# Patient Record
Sex: MALE | URBAN - METROPOLITAN AREA
[De-identification: names, ages, dates, MRNs, and addresses within clinical notes are randomized per-mention and may not be internally consistent; named-entity substitution may affect disease eponyms.]

---

## 2018-10-08 ENCOUNTER — OFFICE VISIT (OUTPATIENT)
Dept: FAMILY MEDICINE CLINIC | Age: 58
End: 2018-10-08

## 2018-10-08 VITALS
DIASTOLIC BLOOD PRESSURE: 80 MMHG | SYSTOLIC BLOOD PRESSURE: 151 MMHG | TEMPERATURE: 98.7 F | BODY MASS INDEX: 28.51 KG/M2 | WEIGHT: 160.9 LBS | HEIGHT: 63 IN | OXYGEN SATURATION: 96 % | RESPIRATION RATE: 19 BRPM | HEART RATE: 54 BPM

## 2018-10-08 DIAGNOSIS — F41.9 ANXIETY: ICD-10-CM

## 2018-10-08 DIAGNOSIS — I10 ESSENTIAL HYPERTENSION: Primary | ICD-10-CM

## 2018-10-08 DIAGNOSIS — R10.13 EPIGASTRIC PAIN: ICD-10-CM

## 2018-10-08 RX ORDER — AMLODIPINE BESYLATE 5 MG/1
5 TABLET ORAL DAILY
COMMUNITY
End: 2018-10-08 | Stop reason: SDUPTHER

## 2018-10-08 RX ORDER — FLUOXETINE HYDROCHLORIDE 20 MG/1
20 CAPSULE ORAL DAILY
Qty: 30 CAP | Refills: 1 | Status: SHIPPED | OUTPATIENT
Start: 2018-10-08

## 2018-10-08 RX ORDER — AMLODIPINE BESYLATE 10 MG/1
10 TABLET ORAL DAILY
Qty: 30 TAB | Refills: 1 | Status: SHIPPED | OUTPATIENT
Start: 2018-10-08

## 2018-10-08 RX ORDER — LISINOPRIL AND HYDROCHLOROTHIAZIDE 12.5; 2 MG/1; MG/1
TABLET ORAL DAILY
COMMUNITY

## 2018-10-08 NOTE — PROGRESS NOTES
Chief Complaint   Patient presents with    Establish Care    Hypertension    Anxiety     Pt here to establish care with provide     Pt states he was being seen by another clinic [facility] [place] being treated for HTN with lisinopril-HCTZ and amlodipine, pt states the current medication dies not help him, which is why hes wanting another opinion. Pt also c/o frequent anxiety stating he consistently rethinks over and over things, and its hard for him to sleep because of the anxiety then he starts to get stressed out.

## 2018-10-08 NOTE — MR AVS SNAPSHOT
[address] [address] 
[phone] Patient: Ralf Christensen MRN: [id number] Visit Information Arch Robin Chicas Personal Médico Departamento Teléfono del Dep. Número de visita 10/8/2018  9:15 AM Weston Layne  [facility] [phone] [phone] Follow-up Instructions Return in about 4 weeks (around 11/5/2018). Upcoming Health Maintenance Date Due Hepatitis C Screening 1960 DTaP/Tdap/Td series (1 - Tdap) 8/6/1980 Shingrix Vaccine Age 50> (1 of 2) 8/6/2009 FOBT Q 1 YEAR AGE 50-75 8/6/2009 Influenza Age 5 to Adult 8/1/2018 Alergias  Review Complete El: 10/8/2018 Por: Weston Layne NP  
 A partir del:  10/8/2018 No Known Allergies Vacunas actuales Abhilash [place] No hay ninguna vacuna archivada. No revisadas esta visita You Were Diagnosed With   
  
 Lucy Lna Essential hypertension    -  Primary ICD-10-CM: I10 
ICD-9-CM: 401.9 Epigastric pain     ICD-10-CM: R10.13 ICD-9-CM: 789.06 Anxiety     ICD-10-CM: F41.9 ICD-9-CM: 300.00 Partes vitales PS Pulso Temperatura Resp Buda ( percentil de crecimiento) Peso (percentil de crecimiento) 151/80 (!) 54 98.7 °F (37.1 °C) (Oral) 19 5' 3\" (1.6 m) 160 lb 14.4 oz (73 kg) SpO2 BMI (IMC) Estatus de tabaquísmo 96% 28.5 kg/m2 Former Smoker Historial de signos vitales BMI and BSA Data Body Mass Index Body Surface Area 28.5 kg/m 2 1.8 m 2 Adenike Cardona Pharmacy Name Phone [facility] [address] [facility] [place] [facility], [address] [address] [place] [phone] Lepe lista de medicamentos actualizada Lista actualizada 10/8/18 10:08 AM.  Francisco Ogon use lepe lista de medicamentos más reciente. amLODIPine 10 mg tablet También conocido duc:  Rainer Urbinaon Take 1 Tab by mouth daily. FLUoxetine 20 mg capsule También conocido duc:  PROzac Take 1 Cap by mouth daily. lisinopril-hydroCHLOROthiazide 20-12.5 mg per tablet También conocido duc:  Cloycnay Luna Take  by mouth daily. Recetas Enviado a la [place] Refills FLUoxetine (PROZAC) 20 mg capsule 1 Sig: Take 1 Cap by mouth daily. Class: Normal  
 Pharmacy: [address] [place] [facility], [address] #: [phone] Route: Oral  
 amLODIPine (NORVASC) 10 mg tablet 1 Sig: Take 1 Tab by mouth daily. Class: Normal  
 [facility] [address] [facility] [place] [facility], [address] Ph #: [phone] Route: Oral  
  
Hicimos lo siguiente LIPID PANEL [44721 CPT(R)] METABOLIC PANEL, COMPREHENSIVE [04838 CPT(R)] TSH 3RD GENERATION [41295 CPT(R)] Instrucciones de seguimiento Return in about 4 weeks (around 11/5/2018). Por hacer 10/08/2018 Imaging:  US ABD COMP Instrucciones para el Paciente Presión arterial betsy: Instrucciones de cuidado - [ High Blood Pressure: Care Instructions ] Instrucciones de cuidado Si chandler presión arterial suele ser superior a 130/80, usted tiene presión arterial betsy o hipertensión. [place] significa que el número de [place] es 130 o más alto o que el número de abajo es [de-identified] o [address], o ambas cosas. A pesar de lo que mucha gente yissel, la hipertensión no suele causar dolor de jayne ni provocar mareos o aturdimiento. Generalmente, no presenta síntomas. Sin embargo, aumenta el riesgo de ataque al corazón, ataque cerebral, y daños en los riñones o en los ojos. A mayor presión arterial, mayor riesgo. Chandler médico le dará dashawn meta para chandler presión arterial. Chandler meta se basará en chandler evan y chandler edad.  
Los cambios de estilo de abril, duc alimentarse en forma saludable y [place], siempre son importantes para ayudarle a bajar la presión arterial. También podría caio medicamentos para lograr chandler meta para la presión arterial. 
La atención de seguimiento es dashawn parte clave de chandler tratamiento y seguridad. Asegúrese de hacer y acudir a todas las citas, y llame a chandler médico si está teniendo problemas. También es dashawn buena idea saber los resultados de leander exámenes y mantener dashawn lista de los medicamentos que kota. Cómo puede cuidarse en el hogar? Tratamiento médico 
· Si glenna de caio leander medicamentos, chandler presión arterial volverá a subir. Es posible que deba caio un tipo de [place], o más de [place], para reducir la presión arterial. Sea juan jose con los medicamentos. [place] chandler medicamento exactamente duc se lo hayan indicado. Llame a chandler médico si yissel estar teniendo problemas con chandler medicamento. · Hable con chandler médico antes de empezar a caio Mala Ferrari. La aspirina puede ayudar a determinadas personas a reducir chandler riesgo de tener un ataque cardíaco o un ataque cerebral. Tina caio aspirina no es adecuado para todo el TRENGEREID, debido a que puede causar sangrado grave. · Visite a chandler médico periódicamente. Usted podría necesitar consultar a chandler médico con más frecuencia al principio o hasta que se reduzca chandler presión arterial. 
· Si usted está tomando medicamentos para la presión arterial, hable con chandler médico antes de caio medicamentos descongestionantes o antiinflamatorios, duc ibuprofeno. Algunos de estos medicamentos pueden elevar la presión arterial. 
· Aprenda a revisarse la presión arterial en chandler hogar. Cambios en el estilo de abril · Mantenga un peso saludable. [place] es particularmente importante si aumenta de peso en la taryn de la cintura. Bajar solo 10 libras (4.5 kg) puede ayudarle a reducir chandler presión arterial. 
· Ramon más ejercicios si chandler médico se lo recomienda. Caminar es dashawn buena opción. Poco a poco, aumente la distancia que [employer]. Intente hacer por lo menos 30 minutos de ejercicio la mayoría de los días de la [place].  También podría nadar, montar en bicicleta o hacer otras actividades. · No tome alcohol o limite la cantidad que fabiola. Hable con chandler médico acerca de si puede caio alcohol. · Trate de limitar la cantidad de sodio que consume a menos de 2,300 miligramos (mg) al día. Chandler médico podría pedirle que trate de consumir menos de 1,500 mg al día. · Coma abundantes frutas (duc bananas [plátanos] y naranjas), verduras, legumbres, granos integrales y productos lácteos de bajo contenido de [place]. · Reduzca la cantidad de grasas saturadas en chandler dieta. Los productos derivados de los [place], duc la [place], el queso y la carne, contienen grasas saturadas. El limitar estos alimentos podría ayudarle a bajar de peso y [place] reducir chandler riesgo de dashawn enfermedad cardíaca. · No fume. El hábito de fumar aumenta chandler riesgo de ataque cerebral y ataque al corazón. Si necesita ayuda para dejar de fumar, hable con chandler médico sobre programas y medicamentos para dejar de fumar. Estos pueden aumentar leander probabilidades de dejar el hábito para siempre. Cuándo debe pedir ayuda? Llame al 911 en cualquier momento que considere que necesita atención de [place]. [place] puede significar que tiene síntomas que sugieren que chandler presión arterial le está causando un problema cardíaco o vascular grave. Chandler presión arterial podría ser superior a 180/110. 
 Por ejemplo, llame al 911 si: 
  · Tiene síntomas de un ataque al corazón. Estos pueden incluir: ¨ Dolor o presión en el pecho, o dashawn sensación extraña en el pecho. ¨ Sudoración. ¨ Falta de aire. ¨ Náuseas o vómito. ¨ Dolor, presión o dashawn sensación extraña en la espalda, el nicolette, la mandíbula, la parte superior del abdomen o en shikha o ambos hombros o brazos. ¨ Aturdimiento o debilidad repentina. ¨ Latidos del corazón rápidos o irregulares.  
  · Tiene síntomas de un ataque cerebral. Estos pueden incluir: 
¨ Entumecimiento, hormigueo, debilidad o parálisis repentinos en la lyly, el brazo o la pierna, sobre todo en un solo lado del cuerpo. ¨ Cambios repentinos en la visión. ¨ Dificultades repentinas para hablar. ¨ Confusión repentina o dificultad súbita para comprender frases sencillas. ¨ Problemas repentinos para caminar o mantener el equilibrio. ¨ Dolor de [place] intenso y repentino, distinto de los christina de jayne anteriores.  
  · Tiene un dolor intenso en la espalda o el abdomen.  
 No espere a que la presión arterial baje por sí trey. Obtenga ayuda de inmediato. 
 Llame a chandler médico ahora mismo o busque atención de inmediato si: 
  · Tiene la presión arterial mucho más betsy de lo normal (duc 180/110 o más betsy), yolanda no tiene síntomas.  
  · Piensa que la presión arterial betsy le está provocando síntomas, duc: ¨ Dolor de jayne intenso. ¨ Visión borrosa.  
 Vigile de cerca los cambios en chandler evan, y asegúrese de comunicarse con chandler médico si: 
  · Chandler presión arterial mide 140/90 o más en al menos 2 ocasiones. [place] significa que el número de [place] es 140 o superior o el número de [facility] es 80 o superior, o ambas cosas.  
  · Marlyn que podría estar teniendo efectos secundarios de los medicamentos para la presión arterial.  
  · Chandler presión arterial suele ser normal, yolanda se eleva por encima de lo normal en al menos 2 ocasiones. Dónde puede encontrar más información en inglés? Lulu linares [email or web address] [place] T505 en la búsqueda para aprender más acerca de \"Presión arterial betsy: Instrucciones de cuidado - [ High Blood Pressure: Care Instructions ]. \" 
Revisado: 6 lala, 2017 Versión del contenido: 11.8 © 3597-9367 Healthwise, Incorporated. Las instrucciones de cuidado fueron adaptadas bajo licencia por Good [employer] (which disclaims liability or warranty for this information). Si usted tiene [place] afección médica o sobre estas instrucciones, siempre pregunte a chandler profesional de evan. [facility], Incorporated niega toda garantía o responsabilidad por chandler uso de esta información. Aprenda acerca de la atención plena para el estrés - [ Learning About Mindfulness for Stress ] 

Saul Schwartzdusty son Alysia Fabius y el estrés? El estrés es lo que usted siente cuando tiene que manejar más cosas de las que está acostumbrado. Muchas cosas pueden causar estrés. Es posible que sienta estrés cuando va a dashawn entrevista de [facility] o corre [place] ortega. Evelyn tipo de estrés a corto plazo es normal e incluso es [place]. Puede ayudarle si tiene que trabajar mucho o reaccionar rápidamente. El estrés también puede durar [employer]. El estrés a elise plazo está causado por situaciones o acontecimientos estresantes. Los ejemplos del estrés a elise plazo incluyen problemas de evan crónicos, problemas continuos en el trabajo y conflictos de [facility]. El estrés a elise plazo puede ser dañino para la evan. La atención, o conciencia, plena se centra en prestar atención únicamente a lo que está ocurriendo en el [facility]. Es un proceso de prestar atención deliberadamente y ser consciente de chandler entorno, leander emociones, leander pensamientos y cómo se siente chandler cuerpo. Usted es consciente de estas cosas, yolanda no juzga estas experiencias duc \"buenas\" o \"malas\". La atención plena puede ayudar a calmar el cuerpo y la mente para ayudarle a sobrellevar la enfermedad, el dolor y el estrés. Cómo ayuda la atención plena a aliviar el estrés? La atención plena puede ayudar a calmar la mente y relajar el cuerpo. Los estudios [employer] puede ayudar a algunas personas a dormir mejor, a sentirse menos ansiosas y a bajar la presión arterial. Y se ha demostrado que ayuda a algunas personas a sobrellevar y vivir mejor con ciertos problemas de evan duc enfermedad cardíaca, depresión, dolor crónico y cáncer. Cómo se practica la atención plena? Tener conciencia plena es prestar atención, estar presente y ser [place].  
· Cuando usted tiene [employer], solo hace dashawn cosa a la vez y presta especial atención a darlene actividad. Por ejemplo, puede sentarse tranquilamente y notar leander emociones o el sabor y [address] de la comida. · Cuando está presente, usted centra chandler atención en las cosas que están pasando en rudy instante. Usted glenna de pensar en el pasado y en el futuro. Cuando usted se centra en el pasado o en el futuro, se pierde momentos que pueden permitirle sanar y fortalecerse. Usted puede perderse momentos duc escuchar la montez de un sophia o andrade un brenda amigable cuando yissel que está solo. · Cuando usted acepta la realidad, no juzga el [address] En chandler lugar, usted acepta leander pensamientos y sentimientos según se presentan. Usted puede practicar en cualquier momento, en cualquier lugar y de cualquier manera que usted [place]do Cellar. Puede practicar de [employer]. Aquí tiene algunas ideas: · Mientras hace las tareas domésticas, duc juan los platos, deje que chandler mente se concentre en lo que tiene en la [place]. Cómo se siente el plato? Está fría o caliente el agua? · Jose De Jesus Flaquita y respire profundamente unas cuantas veces. Cómo es el aire? Es Roula Roulette? · Cuando pueda, tómese tiempo al comienzo del día para sentarse solo y pensar. · Salga a caminar lentamente por sí solo. Cuente los pasos que kota mientras inhala y [place]. · Pruebe a hacer ejercicios de respiración de yoga así duc estiramientos y posturas para fortalecer y relajar los músculos. · Si puede hacer esto en el trabajo, trate de detenerse por un momento cada hora. [address] sensaciones en chandler cuerpo. Dese tiempo para recuperarse y permita que chandler mente se calme antes de volver a lo que estaba haciendo. · Si corey con la ansiedad o con \"pensamientos de preocupación\", imagine chandler mente duc un deondre timur y leander pensamientos de preocupación duc nubes. Ahora, imagine esos pensamientos de preocupación flotando a través del deondre de chandler mente. Simplemente, déjelos pasar mientras los observa. La atención de seguimiento es dashawn parte clave de chandler tratamiento y seguridad. Asegúrese de hacer y acudir a todas las citas, y llame a chandler médico si está teniendo problemas. También es dashawn buena idea saber los resultados de leander exámenes y mantener dashawn lista de los medicamentos que kota. Dónde puede encontrar más información en inglés? Noreen Baer a [email or web address] Donald Gallardo V365 en la búsqueda para aprender más acerca de \"Aprenda acerca de la atención plena para el estrés - [ Learning About Mindfulness for Stress ]. \" 
Revisado: 29 junio, 2018 Versión del contenido: 11.8 © 0687-1502 Healthwise, Incorporated. Las instrucciones de cuidado fueron adaptadas bajo licencia por Good [employer] and [employer] (which disclaims liability or warranty for this information). Si usted tiene [place] Beauty afección médica o sobre estas instrucciones, siempre pregunte a chandler profesional de evan. Healthwise, Incorporated niega toda garantía o responsabilidad por chandler uso de esta información. Introducing [facility]! [facility] introduce portal paciente MyChart . Ahora se puede acceder a partes de chandler expediente médico, enviar por correo electrónico la oficina de chandler médico y solicitar renovaciones de medicamentos en línea. En chandler navegador de Internet , Ibeth Million a [email or web address] Kendall dominique en el usuario por Lili Richardson? Pasquale Jordana fox aquí en la sesión [facility]. Verá la página de registro [place]. Ingrese chandler código de [facility] Danyell virgie y duc aparece a continuación. Isa no tendrá que [employer] código después de ila completado el proceso de registro . Si usted no se inscribe antes de la fecha de caducidad , debe solicitar un nuevo código. · MyChart Código de acceso : [id number] Expires: 1/6/2019 10:03 AM 
 
Ingresa los últimos cuatro dígitos de chandler Número de Seguro Social ( xxxx ) y fecha de nacimiento ( dd / mm / aaaa ) duc se indica y kendall clic en Enviar. Usted será llevado a la siguiente página de registro . Crear un ID MyChart . Esta será chandler ID de inicio de sesión de MyChart y no puede ser [place] , por lo que pensar en dashawn que es [employer] y fácil de recordar . Crear dashawn contraseña MyChart . Usted puede cambiar chandler contraseña en cualquier momento . Ingrese chandler Password Reset de preguntas y [place] . [place] se puede utilizar en un momento posterior si usted olvida chandler contraseña. Introduzca chandler dirección de correo electrónico . Garon Power recibirá dashawn notificación por correo electrónico cuando la nueva información está disponible en MyChart . Elinore Hotter clic en Registrarse. Lorelie Guerrero andrade y descargar porciones de chandler expediente médico. 
Ramon clic en el enlace de descarga del menú Resumen para descargar dashawn copia portátil de chandler información médica . Si tiene [employer] , por favor visite la sección de preguntas frecuentes del sitio web MyChart . Recuerde, MyChart NO es que se utilizará para las necesidades urgentes. Para emergencias médicas , llame al 911 . Ahora disponible en chandler iPhone y Android ! Por favor proporcione rudy resumen de la documentación de cuidado a chandler próximo proveedor. Your primary care clinician is listed as Jose Kumar. If you have any questions after today's visit, please call [phone].

## 2018-10-08 NOTE — PATIENT INSTRUCTIONS
Presión arterial betsy: Instrucciones de cuidado - [ High Blood Pressure: Care Instructions ]  Instrucciones de cuidado    Si chandler presión arterial suele ser superior a 130/80, usted tiene presión arterial betsy o hipertensión. [place] significa que el número de [place] es 130 o más alto o que el número de abajo es [de-identified] o [address], o ambas cosas. A pesar de lo que mucha gente yissel, la hipertensión no suele causar dolor de jayne ni provocar mareos o aturdimiento. Generalmente, no presenta síntomas. Sin embargo, aumenta el riesgo de ataque al corazón, ataque cerebral, y daños en los riñones o en los ojos. A mayor presión arterial, mayor riesgo. Chandler médico le dará dashawn meta para chandler presión arterial. Chandler meta se basará en chandler evan y chandler edad. Los cambios de estilo de abril, duc alimentarse en forma saludable y [place], siempre son importantes para ayudarle a bajar la presión arterial. También podría caio medicamentos para lograr chandler meta para la presión arterial.  La atención de seguimiento es dashawn parte clave de chandler tratamiento y seguridad. Asegúrese de hacer y acudir a todas las citas, y llame a chandler médico si está teniendo problemas. También es dashawn buena idea saber los resultados de leander exámenes y mantener dashawn lista de los medicamentos que kota. ¿Cómo puede cuidarse en el hogar? Tratamiento médico  · Si glenna de caio leander medicamentos, chandler presión arterial volverá a subir. Es posible que deba caio un tipo de [place], o más de [place], para reducir la presión arterial. Sea juan jose con los medicamentos. [place] chandler medicamento exactamente duc se lo hayan indicado. Llame a chandler médico si yissel estar teniendo problemas con chandler medicamento. · Hable con chandler médico antes de empezar a caio [facility].  La aspirina puede ayudar a determinadas personas a reducir chandler riesgo de tener un ataque cardíaco o un ataque cerebral. Tina caio aspirina no es adecuado para todo el TRENGEREID, debido a que puede causar sangrado grave.  · Visite a chandler médico periódicamente. Usted podría necesitar consultar a chandler médico con más frecuencia al principio o hasta que se reduzca chandler presión arterial.  · Si usted está tomando medicamentos para la presión arterial, hable con chandler médico antes de caio medicamentos descongestionantes o antiinflamatorios, duc ibuprofeno. Algunos de estos medicamentos pueden elevar la presión arterial.  · Aprenda a revisarse la presión arterial en chandler hogar. Cambios en el estilo de abril  · Mantenga un peso saludable. Bode es particularmente importante si aumenta de peso en la taryn de la cintura. Bajar solo 10 libras (4.5 kg) puede ayudarle a reducir chandler presión arterial.  · Ramon más ejercicios si chandler médico se lo recomienda. Caminar es dashawn buena opción. Poco a poco, aumente la distancia que [employer]. Intente hacer por lo menos 30 minutos de ejercicio la mayoría de los días de la [place]. También podría nadar, montar en bicicleta o hacer otras actividades. · No tome alcohol o limite la cantidad que fabiola. Hable con chandler médico acerca de si puede caio alcohol. · Trate de limitar la cantidad de sodio que consume a menos de 2,300 miligramos (mg) al día. Chandler médico podría pedirle que trate de consumir menos de 1,500 mg al día. · Coma abundantes frutas (duc bananas [plátanos] y naranjas), verduras, legumbres, granos integrales y productos lácteos de bajo contenido de Alaina lily. · Reduzca la cantidad de grasas saturadas en chandler dieta. Los productos derivados de los [place], duc la [place], el queso y la carne, contienen grasas saturadas. El limitar estos alimentos podría ayudarle a bajar de peso y [place] reducir chandler riesgo de dashawn enfermedad cardíaca. · No fume. El hábito de fumar aumenta chandler riesgo de ataque cerebral y ataque al corazón. Si necesita ayuda para dejar de fumar, hable con chandler médico sobre programas y medicamentos para dejar de fumar.  Estos pueden aumentar leander probabilidades de dejar el hábito para siempre. ¿Cuándo debe pedir ayuda? Llame al 911 en cualquier momento que considere que necesita atención de [place]. [place] puede significar que tiene síntomas que sugieren que chandler presión arterial le está causando un problema cardíaco o vascular grave. Chandler presión arterial podría ser superior a 180/110.   Por ejemplo, llame al 911 si:    · Tiene síntomas de un ataque al corazón. Estos pueden incluir:  ¨ Dolor o presión en el pecho, o dashawn sensación extraña en el pecho. ¨ Sudoración. ¨ Falta de aire. ¨ Náuseas o vómito. ¨ Dolor, presión o dashawn sensación extraña en la espalda, el nicolette, la mandíbula, la parte superior del abdomen o en shikha o ambos hombros o brazos. ¨ Aturdimiento o debilidad repentina. ¨ Latidos del corazón rápidos o irregulares.     · Tiene síntomas de un ataque cerebral. Estos pueden incluir:  ¨ Entumecimiento, hormigueo, debilidad o parálisis repentinos en la lyly, el brazo o la pierna, sobre todo en un solo lado del cuerpo. ¨ Cambios repentinos en la visión. ¨ Dificultades repentinas para hablar. ¨ Confusión repentina o dificultad súbita para comprender frases sencillas. ¨ Problemas repentinos para caminar o mantener el equilibrio. ¨ Dolor de [place] intenso y repentino, distinto de los christina de jayne anteriores.     · Tiene un dolor intenso en la espalda o el abdomen.    No espere a que la presión arterial baje por sí trey. Obtenga ayuda de inmediato.   Llame a chandler médico ahora mismo o busque atención de inmediato si:    · Tiene la presión arterial mucho más betsy de lo normal (duc 180/110 o más betsy), yolanda no tiene síntomas.     · Piensa que la presión arterial betsy le está provocando síntomas, duc:  ¨ Dolor de jayne intenso. ¨ Visión borrosa.    Vigile de cerca los cambios en chandler evan, y asegúrese de comunicarse con chandler médico si:    · Chandler presión arterial mide 140/90 o más en al menos 2 ocasiones.  [place] significa que el número de [place] es 140 o superior o el número de [facility] es 80 o superior, o ambas cosas.     · Marlyn que podría estar teniendo efectos secundarios de los medicamentos para la presión arterial.     · Chandler presión arterial suele ser normal, yolanda se eleva por encima de lo normal en al menos 2 ocasiones. ¿Dónde puede encontrar más información en inglés? Yeimy Spina a [email or web address]-yuliana.info/. [id number] en la búsqueda para aprender más acerca de \"Presión arterial betsy: Instrucciones de cuidado - [ High Blood Pressure: Care Instructions ]. \"  Revisado: 6 diciembre, 2017  Versión del contenido: 11.8  © 9619-9877 Healthwise, Incorporated. Las instrucciones de cuidado fueron adaptadas bajo licencia por [employer] (which disclaims liability or warranty for this information). Si usted tiene [place] [facility] afección médica o sobre estas instrucciones, siempre pregunte a chandler profesional de evan. Healthwise, Incorporated niega toda garantía o responsabilidad por chandler uso de esta información. Aprenda acerca de la atención plena para el estrés - [ Learning About Mindfulness for Stress ]  ¿Qué son la Milderd Cater y el estrés? El estrés es lo que usted siente cuando tiene que manejar más cosas de las que está acostumbrado. Muchas cosas pueden causar estrés. Es posible que sienta estrés cuando va a dashawn entrevista de [facility] prueba o [facility] shannon. Evelyn tipo de estrés a corto plazo es normal e incluso es [place]. Puede ayudarle si tiene que trabajar mucho o reaccionar rápidamente. El estrés también puede durar [employer]. El estrés a elise plazo está causado por situaciones o acontecimientos estresantes. Los ejemplos del estrés a elise plazo incluyen problemas de evan crónicos, problemas continuos en el trabajo y conflictos de [facility]. El estrés a elise plazo puede ser dañino para la evan. La atención, o conciencia, plena se centra en prestar atención únicamente a lo que está ocurriendo en el [place].  Es un proceso de prestar atención deliberadamente y ser consciente de chandler entorno, leander emociones, leander pensamientos y cómo se siente chandler cuerpo. Usted es consciente de estas cosas, yolanda no juzga estas experiencias duc \"buenas\" o \"malas\". La atención plena puede ayudar a calmar el cuerpo y la mente para ayudarle a sobrellevar la enfermedad, el dolor y el estrés. ¿Cómo ayuda la atención plena a aliviar el estrés? La atención plena puede ayudar a calmar la mente y relajar el cuerpo. Los estudios [employer] puede ayudar a algunas personas a dormir mejor, a sentirse menos ansiosas y a bajar la presión arterial. Y se ha demostrado que ayuda a algunas personas a sobrellevar y vivir mejor con ciertos problemas de evan duc enfermedad cardíaca, depresión, dolor crónico y cáncer. ¿Cómo se practica la atención plena? Tener conciencia plena es prestar atención, estar presente y ser [place]. · Cuando usted tiene [employer], solo hace dashawn cosa a la vez y presta especial atención a darlene actividad. Por ejemplo, puede sentarse tranquilamente y notar leander emociones o el sabor y [address] de la comida. · Cuando está presente, usted centra chandler atención en las cosas que están pasando en rudy instante. Usted glenna de pensar en el pasado y en el futuro. Cuando usted se centra en el pasado o en el futuro, se pierde momentos que pueden permitirle sanar y fortalecerse. Usted puede perderse momentos duc escuchar la montez de un sophia o andrade un brenda amigable cuando yissel que está solo. · Cuando usted acepta la realidad, no juzga el [address]. En chandler lugar, usted acepta leander pensamientos y sentimientos según se presentan. Usted puede practicar en cualquier momento, en cualquier lugar y de cualquier manera que usted [facility]. Puede practicar de [employer]. Aquí tiene algunas ideas:  · Mientras hace las tareas domésticas, duc juan los platos, deje que chandler mente se concentre en lo que tiene en la [place]. ¿Cómo se siente el plato?  ¿Está fría o caliente el agua?  · Adrian Crozier y respire profundamente unas cuantas veces. ¿Cómo es el aire? ¿Es caliente o frío? · Cuando pueda, tómese tiempo al comienzo del día para sentarse solo y pensar. · Salga a caminar lentamente por sí solo. Cuente los pasos que kota mientras inhala y [place]. · Pruebe a hacer ejercicios de respiración de yoga así duc estiramientos y posturas para fortalecer y relajar los músculos. · Si puede hacer esto en el trabajo, trate de detenerse por un momento cada hora. [address] sensaciones en chandler cuerpo. Dese tiempo para recuperarse y permita que chandler mente se calme antes de volver a lo que estaba haciendo. · Si corey con la ansiedad o con \"pensamientos de preocupación\", imagine chandler mente duc un deondre timur y leander pensamientos de preocupación duc nubes. Ahora, imagine esos pensamientos de preocupación flotando a través del deondre de chandler mente. Simplemente, déjelos pasar mientras los observa. La atención de seguimiento es dashawn parte clave de chandlre tratamiento y seguridad. Asegúrese de hacer y acudir a todas las citas, y llame a chandler médico si está teniendo problemas. También es dashawn buena idea saber los resultados de leander exámenes y mantener dashawn lista de los medicamentos que kota. ¿Dónde puede encontrar más información en inglés? Sheila Sales a http://maribell.info/. Edda Rodriguez [id number] en la búsqueda para aprender más acerca de \"Aprenda acerca de la atención plena para el estrés - [ Learning About Mindfulness for Stress ]. \"  Revisado: 29 junio, 2018  Versión del contenido: 11.8  © 0729-4537 Healthwise, Incorporated. Las instrucciones de cuidado fueron adaptadas bajo licencia por [employer] (which disclaims liability or warranty for this information). Si usted tiene Winthrop Chamberlain afección médica o sobre estas instrucciones, siempre pregunte a chandler profesional de evan. [employer] niega toda garantía o responsabilidad por chandler uso de esta información.

## 2018-10-08 NOTE — PROGRESS NOTES
Subjective:     Vishal Engel is a 60 y.o. male who presents to [facility] care and for evaluation of hypertension, anxiety, and abdominal pulsations. Accompanied by his daughter, who contributes to the history. Dx 2-3 years ago. Has always been told pressure is well controlled, but feels medications not working since abdominal \"pulsations\" have not resolved. Diet and Lifestyle: not attempting to follow a low fat, low cholesterol diet, not attempting to follow a low sodium diet, exercises regularly, nonsmoker  Home BP Monitoring: is not measured at home    Cardiovascular ROS: taking medications as instructed, no medication side effects noted, no TIA's, no chest pain on exertion, no dyspnea on exertion, no swelling of ankles, no orthostatic dizziness or lightheadedness. New concerns: c/o anxiety \"overthinking\" and worrying about small details, feeling overwhelmed, difficulty sleeping and focusing on tasks. No thoughts of harming self or others. Has never had counseling or medication for the symptoms. There is no problem list on file for this patient. No Known Allergies  Past Medical History:   Diagnosis Date    HTN (hypertension)      History reviewed. No pertinent surgical history. No family history on file.   Social History   Substance Use Topics    Smoking status: Former Smoker     Quit date: 1980    Smokeless tobacco: Never Used    Alcohol use Yes      Comment: occasionally        No results found for: CHOL, CHOLPOCT, HDL, LDL, LDLC, LDLCPOC, LDLCEXT, TRIGL, TGLPOCT, CHHD, CHHDX  No results found for: GPT, ALTPOC, ALT, SGOT, ASTPOC, GGT, AP, APIT, APX, CBIL, TBIL, TBILI, ALB, ALBPOC, TP, NH3, NH4, INR, PTP, PTINR, PTEXT, PLT, PLTPOC, HCABQL, HBSAG, AFP, PTEXT, PLTEXT    No results found for: GFRNA, GFRNAPOC, GFRAA, GFRAAPOC, CREA, CREAPOC, BUN, IBUN, BUNPOC, NA, NAPOC, K, KPOCT, CL, CLPOC, CO2, CO2POC, MG, PHOS, ALBEU, PTH, PTHILT, EPO     Review of Systems, additional:  A comprehensive review of systems was negative except for that written in the HPI. Objective:     Visit Vitals    /88    Pulse (!) 57    Temp 98.7 °F (37.1 °C) (Oral)    Resp 19    Ht 5' 3\" (1.6 m)    Wt 160 lb 14.4 oz (73 kg)    SpO2 96%    BMI 28.5 kg/m2     Appearance: alert, well appearing, and in no distress, oriented to person, place, and time, normal appearing weight and well hydrated. General exam: CVS exam BP noted to be well controlled today in office, S1, S2 normal, no gallop, no murmur, chest clear, no JVD, no HSM, no edema, peripheral vascular exam both carotids normal upstroke without bruits, neurological exam alert, oriented, normal speech, no focal findings or movement disorder noted. No mass or pulsations palpable in the abdomen. No abdominal bruit. Assessment/Plan:     .  reviewed diet, exercise and weight control  copy of written low fat low cholesterol diet provided and reviewed  cardiovascular risk and specific lipid/LDL goals reviewed  reviewed medications and side effects in detail  reviewed potential future medication changes and side effects. Diagnoses and all orders for this visit:    1. Essential hypertension  Above goal  Increase amlodipine dose  Continue prinzide  DASH diet  Weight loss  Daily walking  dwp pulsation sensation may be unrelated to BP and should not be perceived as indicator that meds are \"not working. \"  -     METABOLIC PANEL, COMPREHENSIVE  -     TSH 3RD GENERATION  -     LIPID PANEL  -     amLODIPine (NORVASC) 10 mg tablet; Take 1 Tab by mouth daily. 2. Epigastric pain  Describes episodic pulsating sensation, increasing in frequency and intensity  Check US for possible aneurysm. -     US ABD COMP; Future    3. Anxiety  Add Rx  mindfulness  -     FLUoxetine (PROZAC) 20 mg capsule; Take 1 Cap by mouth daily. Follow-up Disposition:  Return in about 4 weeks (around 11/5/2018).      I have discussed the diagnosis with the patient and the intended plan as seen in the above orders. The patient has received an after-visit summary and questions were answered concerning future plans. Patient conveyed understanding of the plan at the time of the visit.     Hasmukh Sam NP

## 2018-10-09 LAB
ALBUMIN SERPL-MCNC: 4.2 G/DL (ref 3.5–5.5)
ALBUMIN/GLOB SERPL: 1.6 {RATIO} (ref 1.2–2.2)
ALP SERPL-CCNC: 83 IU/L (ref 39–117)
ALT SERPL-CCNC: 27 IU/L (ref 0–44)
AST SERPL-CCNC: 25 IU/L (ref 0–40)
BILIRUB SERPL-MCNC: 0.5 MG/DL (ref 0–1.2)
BUN SERPL-MCNC: 11 MG/DL (ref 6–24)
BUN/CREAT SERPL: 13 (ref 9–20)
CALCIUM SERPL-MCNC: 9.1 MG/DL (ref 8.7–10.2)
CHLORIDE SERPL-SCNC: 104 MMOL/L (ref 96–106)
CHOLEST SERPL-MCNC: 135 MG/DL (ref 100–199)
CO2 SERPL-SCNC: 24 MMOL/L (ref 20–29)
CREAT SERPL-MCNC: 0.82 MG/DL (ref 0.76–1.27)
GLOBULIN SER CALC-MCNC: 2.6 G/DL (ref 1.5–4.5)
GLUCOSE SERPL-MCNC: 97 MG/DL (ref 65–99)
HDLC SERPL-MCNC: 49 MG/DL
INTERPRETATION, 910389: NORMAL
LDLC SERPL CALC-MCNC: 69 MG/DL (ref 0–99)
POTASSIUM SERPL-SCNC: 4.4 MMOL/L (ref 3.5–5.2)
PROT SERPL-MCNC: 6.8 G/DL (ref 6–8.5)
SODIUM SERPL-SCNC: 142 MMOL/L (ref 134–144)
TRIGL SERPL-MCNC: 85 MG/DL (ref 0–149)
TSH SERPL DL<=0.005 MIU/L-ACNC: 1.42 UIU/ML (ref 0.45–4.5)
VLDLC SERPL CALC-MCNC: 17 MG/DL (ref 5–40)

## 2023-04-28 ENCOUNTER — OFFICE VISIT (OUTPATIENT)
Dept: CARDIOLOGY CLINIC | Age: 63
End: 2023-04-28

## 2023-04-28 VITALS
WEIGHT: 166 LBS | HEART RATE: 99 BPM | SYSTOLIC BLOOD PRESSURE: 158 MMHG | RESPIRATION RATE: 16 BRPM | OXYGEN SATURATION: 99 % | BODY MASS INDEX: 29.41 KG/M2 | HEIGHT: 63 IN | DIASTOLIC BLOOD PRESSURE: 100 MMHG

## 2023-04-28 DIAGNOSIS — Z87.891 HISTORY OF TOBACCO USE: ICD-10-CM

## 2023-04-28 DIAGNOSIS — I20.0 UNSTABLE ANGINA (HCC): Primary | ICD-10-CM

## 2023-04-28 DIAGNOSIS — I10 BENIGN ESSENTIAL HTN: ICD-10-CM

## 2023-04-28 DIAGNOSIS — R01.1 CARDIAC MURMUR: ICD-10-CM

## 2023-05-10 ENCOUNTER — HOSPITAL ENCOUNTER (OUTPATIENT)
Facility: HOSPITAL | Age: 63
Discharge: HOME | End: 2023-05-12

## 2023-05-10 VITALS
DIASTOLIC BLOOD PRESSURE: 119 MMHG | BODY MASS INDEX: 29.41 KG/M2 | WEIGHT: 166 LBS | SYSTOLIC BLOOD PRESSURE: 194 MMHG | HEART RATE: 90 BPM | HEIGHT: 63 IN

## 2023-05-10 DIAGNOSIS — I20.0 UNSTABLE ANGINA (HCC): ICD-10-CM

## 2023-05-10 PROCEDURE — 93306 TTE W/DOPPLER COMPLETE: CPT

## 2023-05-11 LAB
ECHO AO ASC DIAM: 3 CM
ECHO AO ASCENDING AORTA INDEX: 1.68 CM/M2
ECHO AV AREA PEAK VELOCITY: 2.4 CM2
ECHO AV AREA VTI: 2.7 CM2
ECHO AV AREA/BSA PEAK VELOCITY: 1.3 CM2/M2
ECHO AV AREA/BSA VTI: 1.5 CM2/M2
ECHO AV MEAN GRADIENT: 2 MMHG
ECHO AV MEAN VELOCITY: 0.7 M/S
ECHO AV PEAK GRADIENT: 5 MMHG
ECHO AV PEAK VELOCITY: 1.1 M/S
ECHO AV VELOCITY RATIO: 0.64
ECHO AV VTI: 17.3 CM
ECHO BSA: 1.83 M2
ECHO LA DIAMETER INDEX: 2.57 CM/M2
ECHO LA DIAMETER: 4.6 CM
ECHO LA VOL 2C: 74 ML (ref 18–58)
ECHO LA VOL 2C: 76 ML (ref 18–58)
ECHO LA VOL 4C: 85 ML (ref 18–58)
ECHO LA VOL 4C: 89 ML (ref 18–58)
ECHO LA VOL BP: 83 ML (ref 18–58)
ECHO LA VOL/BSA BIPLANE: 46 ML/M2 (ref 16–34)
ECHO LA VOLUME AREA LENGTH: 87 ML
ECHO LA VOLUME INDEX AREA LENGTH: 49 ML/M2 (ref 16–34)
ECHO LV E' LATERAL VELOCITY: 8 CM/S
ECHO LV E' SEPTAL VELOCITY: 6 CM/S
ECHO LV EDV A2C: 100 ML
ECHO LV EDV A4C: 84 ML
ECHO LV EDV BP: 93 ML (ref 67–155)
ECHO LV EDV INDEX A4C: 47 ML/M2
ECHO LV EDV INDEX BP: 52 ML/M2
ECHO LV EDV NDEX A2C: 56 ML/M2
ECHO LV EJECTION FRACTION A2C: 51 %
ECHO LV EJECTION FRACTION A4C: 57 %
ECHO LV EJECTION FRACTION BIPLANE: 54 % (ref 55–100)
ECHO LV ESV A2C: 50 ML
ECHO LV ESV A4C: 36 ML
ECHO LV ESV BP: 43 ML (ref 22–58)
ECHO LV ESV INDEX A2C: 28 ML/M2
ECHO LV ESV INDEX A4C: 20 ML/M2
ECHO LV ESV INDEX BP: 24 ML/M2
ECHO LV FRACTIONAL SHORTENING: 35 % (ref 28–44)
ECHO LV INTERNAL DIMENSION DIASTOLE INDEX: 3.07 CM/M2
ECHO LV INTERNAL DIMENSION DIASTOLIC: 5.5 CM (ref 4.2–5.9)
ECHO LV INTERNAL DIMENSION SYSTOLIC INDEX: 2.01 CM/M2
ECHO LV INTERNAL DIMENSION SYSTOLIC: 3.6 CM
ECHO LV IVSD: 1.2 CM (ref 0.6–1)
ECHO LV MASS 2D: 272.4 G (ref 88–224)
ECHO LV MASS INDEX 2D: 152.2 G/M2 (ref 49–115)
ECHO LV POSTERIOR WALL DIASTOLIC: 1.2 CM (ref 0.6–1)
ECHO LV RELATIVE WALL THICKNESS RATIO: 0.44
ECHO LVOT AREA: 3.8 CM2
ECHO LVOT AV VTI INDEX: 0.73
ECHO LVOT DIAM: 2.2 CM
ECHO LVOT MEAN GRADIENT: 1 MMHG
ECHO LVOT PEAK GRADIENT: 2 MMHG
ECHO LVOT PEAK VELOCITY: 0.7 M/S
ECHO LVOT STROKE VOLUME INDEX: 27 ML/M2
ECHO LVOT SV: 48.3 ML
ECHO LVOT VTI: 12.7 CM
ECHO MV A VELOCITY: 0.79 M/S
ECHO MV AREA VTI: 1.3 CM2
ECHO MV E DECELERATION TIME (DT): 153.9 MS
ECHO MV E VELOCITY: 1.44 M/S
ECHO MV E/A RATIO: 1.82
ECHO MV E/E' LATERAL: 18
ECHO MV E/E' RATIO (AVERAGED): 21
ECHO MV E/E' SEPTAL: 24
ECHO MV LVOT VTI INDEX: 2.95
ECHO MV MAX VELOCITY: 2 M/S
ECHO MV MEAN GRADIENT: 7 MMHG
ECHO MV MEAN VELOCITY: 1.2 M/S
ECHO MV PEAK GRADIENT: 16 MMHG
ECHO MV REGURGITANT PEAK GRADIENT: 144 MMHG
ECHO MV REGURGITANT PEAK VELOCITY: 6 M/S
ECHO MV VTI: 37.5 CM
ECHO PULMONARY ARTERY END DIASTOLIC PRESSURE: 4 MMHG
ECHO PV MAX VELOCITY: 0.8 M/S
ECHO PV PEAK GRADIENT: 2 MMHG
ECHO PV REGURGITANT MAX VELOCITY: 1 M/S
ECHO RV INTERNAL DIMENSION: 4.5 CM
ECHO RV TAPSE: 2.1 CM (ref 1.7–?)
ECHO RVOT PEAK GRADIENT: 1 MMHG
ECHO RVOT PEAK VELOCITY: 0.6 M/S
ECHO TV REGURGITANT MAX VELOCITY: 3.41 M/S
ECHO TV REGURGITANT PEAK GRADIENT: 50 MMHG

## 2023-05-17 ENCOUNTER — TELEPHONE (OUTPATIENT)
Age: 63
End: 2023-05-17

## 2023-05-19 RX ORDER — AMLODIPINE BESYLATE 10 MG/1
10 TABLET ORAL DAILY
COMMUNITY
Start: 2018-10-08

## 2023-05-19 RX ORDER — FLUOXETINE HYDROCHLORIDE 20 MG/1
20 CAPSULE ORAL DAILY
COMMUNITY
Start: 2018-10-08

## 2023-05-19 RX ORDER — LISINOPRIL AND HYDROCHLOROTHIAZIDE 20; 12.5 MG/1; MG/1
TABLET ORAL DAILY
COMMUNITY